# Patient Record
Sex: FEMALE | Race: WHITE | Employment: FULL TIME | URBAN - METROPOLITAN AREA
[De-identification: names, ages, dates, MRNs, and addresses within clinical notes are randomized per-mention and may not be internally consistent; named-entity substitution may affect disease eponyms.]

---

## 2017-02-22 ENCOUNTER — OFFICE VISIT (OUTPATIENT)
Dept: OBGYN CLINIC | Age: 52
End: 2017-02-22

## 2017-02-22 ENCOUNTER — APPOINTMENT (OUTPATIENT)
Dept: MAMMOGRAPHY | Age: 52
End: 2017-02-22

## 2017-02-22 VITALS
SYSTOLIC BLOOD PRESSURE: 114 MMHG | HEIGHT: 62 IN | DIASTOLIC BLOOD PRESSURE: 72 MMHG | BODY MASS INDEX: 33.57 KG/M2 | WEIGHT: 182.4 LBS

## 2017-02-22 DIAGNOSIS — Z12.31 ENCOUNTER FOR SCREENING MAMMOGRAM FOR MALIGNANT NEOPLASM OF BREAST: ICD-10-CM

## 2017-02-22 DIAGNOSIS — Z01.419 ENCOUNTER FOR GYNECOLOGICAL EXAMINATION (GENERAL) (ROUTINE) WITHOUT ABNORMAL FINDINGS: Primary | ICD-10-CM

## 2017-02-22 RX ORDER — LEVOTHYROXINE SODIUM 75 UG/1
TABLET ORAL
COMMUNITY

## 2017-02-22 RX ORDER — LANOLIN ALCOHOL/MO/W.PET/CERES
1000 CREAM (GRAM) TOPICAL DAILY
COMMUNITY

## 2017-02-22 NOTE — PATIENT INSTRUCTIONS

## 2017-02-22 NOTE — MR AVS SNAPSHOT
Visit Information Date & Time Provider Department Dept. Phone Encounter #  
 2/22/2017  9:10 AM Nathen Lazo MD [facility] [phone] [id number] Follow-up Instructions Return in about 1 year (around 2/22/2018), or if symptoms worsen or fail to improve, for Annual exam. Upcoming Health Maintenance Date Due  
 BREAST CANCER SCRN MAMMOGRAM 1/20/2016 INFLUENZA AGE 9 TO ADULT 8/1/2016 PAP AKA CERVICAL CYTOLOGY 1/20/2020 Allergies as of 2/22/2017  Review Complete On: 2/22/2017 By: Nathen Lazo MD  
  
 Severity Noted Reaction Type Reactions Amoxicillin  08/20/2010    Hives, Rash No shortness of breath Current Immunizations  Never Reviewed No immunizations on file. Not reviewed this visit You Were Diagnosed With   
  
 Codes Comments Encounter for gynecological examination (general) (routine) without abnormal findings    -  Primary ICD-10-CM: V89.408 ICD-9-CM: V72.31 Vitals Weight(growth percentile) 182 lb 6.4 oz (82.7 kg) BMI and BSA Data Body Mass Index Body Surface Area  
 33.36 kg/m 2 1.9 m 2 Your Updated Medication List  
  
   
This list is accurate as of: 2/22/17  9:14 AM.  Always use your most recent med list.  
  
  
  
  
 cyanocobalamin 1,000 mcg tablet Take 1,000 mcg by mouth daily. multivitamin capsule Take 1 Cap by mouth daily. SYNTHROID 75 mcg tablet Generic drug:  levothyroxine Take  by mouth Daily (before breakfast). Follow-up Instructions Return in about 1 year (around 2/22/2018), or if symptoms worsen or fail to improve, for Annual exam.  
  
  
Patient Instructions Well Visit, Ages 25 to 48: Care Instructions Your Care Instructions Physical exams can help you stay healthy. Your doctor has checked your overall health and may have suggested ways to take good care of yourself. He or she also may have recommended tests. At home, you can help prevent illness with healthy eating, regular exercise, and other steps. Follow-up care is a key part of your treatment and safety. Be sure to make and go to all appointments, and call your doctor if you are having problems. It's also a good idea to know your test results and keep a list of the medicines you take. How can you care for yourself at home? · Reach and stay at a healthy weight. This will lower your risk for many problems, such as obesity, diabetes, heart disease, and high blood pressure. · Get at least 30 minutes of physical activity on most days of the week. Walking is a good choice. You also may want to do other activities, such as running, swimming, cycling, or playing tennis or team sports. Discuss any changes in your exercise program with your doctor. · Do not smoke or allow others to smoke around you. If you need help quitting, talk to your doctor about stop-smoking programs and medicines. These can increase your chances of quitting for good. · Talk to your doctor about whether you have any risk factors for sexually transmitted infections (STIs). Having one sex partner (who does not have STIs and does not have sex with anyone else) is a good way to avoid these infections. · Use birth control if you do not want to have children at this time. Talk with your doctor about the choices available and what might be best for you. · Protect your skin from too much sun. When you're outdoors from 10 a.m. to 4 p.m., stay in the shade or cover up with clothing and a hat with a wide brim. Wear sunglasses that block UV rays. Even when it's cloudy, put broad-spectrum sunscreen (SPF 30 or higher) on any exposed skin. · See a dentist one or two times a year for checkups and to have your teeth cleaned. · Wear a seat belt in the car. · Drink alcohol in moderation, if at all.  That means no more than 2 drinks a day for men and 1 drink a day for women. Follow your doctor's advice about when to have certain tests. These tests can spot problems early. For everyone · Cholesterol. Have the fat (cholesterol) in your blood tested after age 21. Your doctor will tell you how often to have this done based on your age, family history, or other things that can increase your risk for heart disease. · Blood pressure. Have your blood pressure checked during a routine doctor visit. Your doctor will tell you how often to check your blood pressure based on your age, your blood pressure results, and other factors. · Vision. Talk with your doctor about how often to have a glaucoma test. 
· Diabetes. Ask your doctor whether you should have tests for diabetes. · Colon cancer. Have a test for colon cancer at age 48. You may have one of several tests. If you are younger than 48, you may need a test earlier if you have any risk factors. Risk factors include whether you already had a precancerous polyp removed from your colon or whether your parent, brother, sister, or child has had colon cancer. For women · Breast exam and mammogram. Talk to your doctor about when you should have a clinical breast exam and a mammogram. Medical experts differ on whether and how often women under 50 should have these tests. Your doctor can help you decide what is right for you. · Pap test and pelvic exam. Begin Pap tests at age 24. A Pap test is the best way to find cervical cancer. The test often is part of a pelvic exam. Ask how often to have this test. 
· Tests for sexually transmitted infections (STIs). Ask whether you should have tests for STIs. You may be at risk if you have sex with more than one person, especially if your partners do not wear condoms. For men · Tests for sexually transmitted infections (STIs). Ask whether you should have tests for STIs.  You may be at risk if you have sex with more than one person, especially if you do not wear a condom. · Testicular cancer exam. Ask your doctor whether you should check your testicles regularly. · Prostate exam. Talk to your doctor about whether you should have a blood test (called a PSA test) for prostate cancer. Experts differ on whether and when men should have this test. Some experts suggest it if you are older than 39 and are -American or have a father or brother who got prostate cancer when he was younger than 72. When should you call for help? Watch closely for changes in your health, and be sure to contact your doctor if you have any problems or symptoms that concern you. Where can you learn more? Go to http://shane-samia.info/. Enter P072 in the search box to learn more about \"Well Visit, Ages 25 to 48: Care Instructions. \" Current as of: July 19, 2016 Content Version: 11.1 © 5392-2973 [employer]. Care instructions adapted under license by [employer] (which disclaims liability or warranty for this information). If you have questions about a medical condition or this instruction, always ask your healthcare professional. [address] any warranty or liability for your use of this information. Introducing [facility]! [facility] introduces Agilyx patient portal. Now you can access parts of your medical record, email your doctor's office, and request medication refills online. 1. In your internet browser, go to [email or web address] 2. Click on the First Time User? Click Here link in the Sign In box. You will see the New Member Sign Up page. 3. Enter your Agilyx Access Code exactly as it appears below. You will not need to use this code after youve completed the sign-up process. If you do not sign up before the expiration date, you must request a new code. · Ag[employer] Access Code: [email or web address] Expires: 5/23/2017  9:03 AM 
 
 4. Enter the last four digits of your Social Security Number (xxxx) and Date of Birth (mm/dd/yyyy) as indicated and click Submit. You will be taken to the next sign-up page. 5. Create a BeyondCore ID. This will be your BeyondCore login ID and cannot be changed, so think of one that is secure and easy to remember. 6. Create a BeyondCore password. You can change your password at any time. 7. Enter your Password Reset Question and Answer. This can be used at a later time if you forget your password. 8. Enter your e-mail address. You will receive e-mail notification when new information is available in [address] 9. Click Sign Up. You can now view and download portions of your medical record. 10. Click the Download Summary menu link to download a portable copy of your medical information. If you have questions, please visit the Frequently Asked Questions section of the Beyond[employer] website. Remember, BeyondCore is NOT to be used for urgent needs. For medical emergencies, dial 911. Now available from your iPhone and Android! Please provide this summary of care documentation to your next provider. Your primary care clinician is listed as Andrey Mullen. If you have any questions after today's visit, please call [phone].

## 2018-09-26 ENCOUNTER — OFFICE VISIT (OUTPATIENT)
Dept: OBGYN CLINIC | Age: 53
End: 2018-09-26

## 2018-09-26 VITALS
SYSTOLIC BLOOD PRESSURE: 126 MMHG | HEIGHT: 62 IN | BODY MASS INDEX: 29.9 KG/M2 | DIASTOLIC BLOOD PRESSURE: 74 MMHG | WEIGHT: 162.5 LBS

## 2018-09-26 DIAGNOSIS — Z01.419 ENCOUNTER FOR GYNECOLOGICAL EXAMINATION: Primary | ICD-10-CM

## 2018-09-26 RX ORDER — PHENTERMINE AND TOPIRAMATE 7.5; 46 MG/1; MG/1
CAPSULE, EXTENDED RELEASE ORAL
Refills: 4 | COMMUNITY
Start: 2018-07-15 | End: 2021-03-30

## 2018-09-26 NOTE — PROGRESS NOTES
[address] [facility] 
[email or web address] 
[phone] Agnes Culver MD, Harrison Herron Annual Gynecologic Exam 
WWE 40-60 Chief Complaint Patient presents with  Well Woman Autumn Allan is a 50 y.o.  WHITE OR  
female who presents for an annual exam. 
Patient's last menstrual period was 2018. Naomi Gtz She does not report additional concerns today. Occ hot flashes, improved. Occ heavy menses. Menstrual status: 
Her periods are moderate. She does not report dysmenorrhea/painful menses. She does report irregular bleeding. Sexual history and Contraception: 
History Sexual Activity  Sexual activity: Yes  Partners: Male  Birth control/ protection: None Comment:  \"Farran\" She does not reports new sexual partner(s) in the last year. The patient does not request STD testing. Preventive Medicine History: 
Her most recent Pap smear result: normal was obtained in 2015 Her most recent HR HPV screen was Negative obtained in  She does not have a history of CARLOS 2, 3 or cervical cancer. Breast health: 
Last mammogram: approximate date 17 and was normal.  
A mammogram was not scheduled for today. Breast cancer family updated: see . Bone health: Naomi Gtz She does not have a history of osteopenia/osteoporosis. Osteoporosis family history updated: see . Past Medical History:  
Diagnosis Date  DJD (degenerative joint disease), lumbar 2010  
 H/O diagnostic mammography 13 BIRADS I - right side  H/O mammogram 2017  
 normal  
 History of mammogram 10/9/13  
 needs additional views  Hx of mammogram 01/15/15  
 normal  
 Pap smear for cervical cancer screening 6/2/10 Negative, HPV negative  Pap smear for cervical cancer screening 01/20/15  
 normal hpv negative OB History  Para Term  AB Living 2 2 2 0 0 2 SAB TAB Ectopic Molar Multiple Live Births  
0 0 0  0 2 # Outcome Date GA Lbr Lavon/2nd Weight Sex Delivery Anes PTL Lv  
2 Term 08/09/99    F VAGINAL DELI   DESEAN  
1 Term 03/28/97    M VAGINAL DELI   DESEAN Obstetric Comments No pregnancy complications Past Surgical History:  
Procedure Laterality Date  HX WISDOM TEETH EXTRACTION Family History Problem Relation Age of Onset  Heart Disease Father   
  multiple stents, age 66's Social History Social History  Marital status:  Spouse name: N/A  
 Number of children: 2  
 Years of education: N/A Occupational History [employer] Social History Main Topics  Smoking status: Never Smoker  Smokeless tobacco: Never Used  Alcohol use Yes Comment: occasionally  Drug use: No  
 Sexual activity: Yes  
  Partners: Male Birth control/ protection: None Comment:  \"Farran\" Other Topics Concern  Special Diet Yes  
  1200 calorie  Exercise Yes  
  at least 4 days/week Social History Narrative Allergies Allergen Reactions  Amoxicillin Hives and Rash No shortness of breath Current Outpatient Prescriptions Medication Sig  QSYMIA 7.5-46 mg CM24 TK ONE C PO  ONCE A DAY IN THE MORNING  
 Omega-3 Fatty Acids (FISH OIL) 500 mg cap Take  by mouth.  levothyroxine (SYNTHROID) 75 mcg tablet Take  by mouth Daily (before breakfast).  cyanocobalamin 1,000 mcg tablet Take 1,000 mcg by mouth daily.  multivitamin capsule Take 1 Cap by mouth daily. No current facility-administered medications for this visit. Patient Active Problem List  
Diagnosis Code  DJD (degenerative joint disease), lumbar M47.816  Abnormal mammogram R92.8  Obesity (BMI 30.0-34. 9) E66.9 Review of Systems - History obtained from the patient Constitutional: negative for weight loss, fever, night sweats HEENT: negative for hearing loss, earache, congestion, snoring, sorethroat CV: negative for chest pain, palpitations, edema Resp: negative for cough, shortness of breath, wheezing GI: negative for change in bowel habits, abdominal pain, black or bloody stools : negative for frequency, dysuria, hematuria, vaginal discharge MSK: negative for back pain, joint pain, muscle pain Breast: negative for breast lumps, nipple discharge, galactorrhea Skin :negative for itching, rash, hives Neuro: negative for dizziness, headache, confusion, weakness Psych: negative for anxiety, depression, change in mood Heme/lymph: negative for bleeding, bruising, pallor Physical Exam 
Visit Vitals  /74  Ht 5' 2\" (1.575 m)  Wt 162 lb 8 oz (73.7 kg)  LMP 09/14/2018  BMI 29.72 kg/m2 Constitutional 
· Appearance: well-nourished, well developed, alert, in no acute distress HENT 
· Head and Face: appears normal 
 
Neck · Inspection/Palpation: normal appearance, no masses or tenderness · Lymph Nodes: no lymphadenopathy present · Thyroid: gland size normal, nontender, no nodules or masses present on palpation Chest 
· Respiratory Effort: breathing unlabored · Auscultation: normal breath sounds Cardiovascular · Heart: 
· Auscultation: regular rate and rhythm without murmur Breasts · Inspection of Breasts: breasts symmetrical, no skin changes, no discharge present, nipple appearance normal, no skin retraction present · Palpation of Breasts and Axillae: no masses present on palpation, no breast tenderness · Axillary Lymph Nodes: no lymphadenopathy present Gastrointestinal 
· Abdominal Examination: abdomen non-tender to palpation, normal bowel sounds, no masses present · Liver and spleen: no hepatomegaly present, spleen not palpable · Hernias: no hernias identified Genitourinary · External Genitalia: normal appearance for age, no discharge present, no tenderness present, no inflammatory lesions present, no masses present, without atrophy present · Vagina: normal vaginal vault without central or paravaginal defects, no discharge present, no inflammatory lesions present, no masses present · Bladder: non-tender to palpation · Urethra: appears normal 
· Cervix: normal  
· Uterus: normal size, shape and consistency · Adnexa: no adnexal tenderness present, no adnexal masses present · Perineum: perineum within normal limits, no evidence of trauma, no rashes or skin lesions present · Anus: anus within normal limits, no hemorrhoids present · Inguinal Lymph Nodes: no lymphadenopathy present Skin · General Inspection: no rash, no lesions identified Neurologic/Psychiatric · Mental Status: · Orientation: grossly oriented to person, place and time · Mood and Affect: mood normal, affect appropriate Assessment: 
50 y.o.  for well woman exam 
Encounter Diagnosis Name Primary?  Encounter for gynecological examination Yes Plan: The patient was counseled about diet, exercise, healthy lifestyle We discussed current self breast exam and mammogram recommendations We discussed current pap smear and HR HPV testing guidelines We recommend follow up in one year for routine annual gynecologic exam or sooner if needed We recommend follow up with a primary care physician for any chronic medical problems or non-gynecologic concerns We discussed calcium/vitamin D/weight bearing exercise and osteoporosis prevention Handouts were given to the patient We discussed typical perimenopausal bleeding patterns and symptoms. I recommend that she notify MD for chaotic or symptomatic bleeding, or bleeding in between menses or intermenstrual bleeding for additional evaluation. She can also schedule a consult to discuss management of symptoms of perimenopause that are concerning her or interfering with her life or day to day function or activity. Pt prefers to observe for now. Discussed risks, benefits and alternatives of OCP/nuvaring/patch: including but not limited to dvt/pe/mi/cva/ca/gi risks. Disc option of non hormonal options/brisdelle. Pt will observe for now Folllow up: 
[x] return for annual well woman exam in one year or sooner if she is having problems 
[] follow up and ultrasound [x] mammogram 
[] 6 months 
[] 3 months 
[] 1 month Orders Placed This Encounter  PAP IG, HPV AND RFX HPV 73/71,46([id number]) No results found for any visits on 09/26/18.

## 2018-09-26 NOTE — PATIENT INSTRUCTIONS

## 2018-09-26 NOTE — MR AVS SNAPSHOT
[address] [facility] [address] [facility] 
[phone] Patient: Daphne Peter MRN: [id number] :1965 Visit Information Date & Time Provider Department Dept. Phone Encounter #  
 2018  3:40 PM MD Bradley Ansari  Your Appointments 10/15/2018  8:00 AM  
MAMMOGRAPHY with MAMMOGRAM, ROBB Kirby ([facility]) Appt Note: 2D + ae  TP; mammo only/TP 2-D  
 [address] [facility] [address] [place] [address]  
[address] 100[address] [facility] Upcoming Health Maintenance Date Due FOBT Q 1 YEAR AGE 50-75 2017 BREAST CANCER SCRN MAMMOGRAM 2018 Influenza Age 5 to Adult 2018 PAP AKA CERVICAL CYTOLOGY 2020 Allergies as of 2018  Review Complete On: 2018 By: Roe Mckeon LPN Severity Noted Reaction Type Reactions Amoxicillin  2010    Hives, Rash No shortness of breath Current Immunizations  Never Reviewed No immunizations on file. Not reviewed this visit You Were Diagnosed With   
  
 Codes Comments Encounter for gynecological examination    -  Primary ICD-10-CM: I83.202 ICD-9-CM: V72.31 Vitals BP Height(growth percentile) Weight(growth percentile) LMP BMI OB Status 126/74 5' 2\" (1.575 m) 162 lb 8 oz (73.7 kg) 2018 29.72 kg/m2 Having regular periods Smoking Status Never Smoker BMI and BSA Data Body Mass Index Body Surface Area  
 29.72 kg/m 2 1.8 m 2 Preferred Pharmacy Pharmacy Name Phone [facility] [address]  [facility] [phone] Your Updated Medication List  
  
   
This list is accurate as of 18  4:09 PM.  Always use your most recent med list.  
  
  
 cyanocobalamin 1,000 mcg tablet Take 1,000 mcg by mouth daily. FISH  mg Cap Generic drug:  Omega-3 Fatty Acids Take  by mouth.  
  
 multivitamin capsule Take 1 Cap by mouth daily. QSYMIA 7.5-46 mg Cm24 Generic drug:  phentermine-topiramate TK ONE C PO  ONCE A DAY IN THE MORNING  
  
 SYNTHROID 75 mcg tablet Generic drug:  levothyroxine Take  by mouth Daily (before breakfast). We Performed the Following PAP IG, HPV AND RFX HPV 13/02,53(977100) [TRY065304 Custom] Patient Instructions Well Visit, Ages 25 to 48: Care Instructions Your Care Instructions Physical exams can help you stay healthy. Your doctor has checked your overall health and may have suggested ways to take good care of yourself. He or she also may have recommended tests. At home, you can help prevent illness with healthy eating, regular exercise, and other steps. Follow-up care is a key part of your treatment and safety. Be sure to make and go to all appointments, and call your doctor if you are having problems. It's also a good idea to know your test results and keep a list of the medicines you take. How can you care for yourself at home? · Reach and stay at a healthy weight. This will lower your risk for many problems, such as obesity, diabetes, heart disease, and high blood pressure. · Get at least 30 minutes of physical activity on most days of the week. Walking is a good choice. You also may want to do other activities, such as running, swimming, cycling, or playing tennis or team sports. Discuss any changes in your exercise program with your doctor. · Do not smoke or allow others to smoke around you. If you need help quitting, talk to your doctor about stop-smoking programs and medicines. These can increase your chances of quitting for good.  
· Talk to your doctor about whether you have any risk factors for sexually transmitted infections (STIs). Having one sex partner (who does not have STIs and does not have sex with anyone else) is a good way to avoid these infections. · Use birth control if you do not want to have children at this time. Talk with your doctor about the choices available and what might be best for you. · Protect your skin from too much sun. When you're outdoors from 10 a.m. to 4 p.m., stay in the shade or cover up with clothing and a hat with a wide brim. Wear sunglasses that block UV rays. Even when it's cloudy, put broad-spectrum sunscreen (SPF 30 or higher) on any exposed skin. · See a dentist one or two times a year for checkups and to have your teeth cleaned. · Wear a seat belt in the car. · Drink alcohol in moderation, if at all. That means no more than 2 drinks a day for men and 1 drink a day for women. Follow your doctor's advice about when to have certain tests. These tests can spot problems early. For everyone · Cholesterol. Have the fat (cholesterol) in your blood tested after age 21. Your doctor will tell you how often to have this done based on your age, family history, or other things that can increase your risk for heart disease. · Blood pressure. Have your blood pressure checked during a routine doctor visit. Your doctor will tell you how often to check your blood pressure based on your age, your blood pressure results, and other factors. · Vision. Talk with your doctor about how often to have a glaucoma test. 
· Diabetes. Ask your doctor whether you should have tests for diabetes. · Colon cancer. Have a test for colon cancer at age 48. You may have one of several tests. If you are younger than 48, you may need a test earlier if you have any risk factors. Risk factors include whether you already had a precancerous polyp removed from your colon or whether your parent, brother, sister, or child has had colon cancer. For women · Breast exam and mammogram. Talk to your doctor about when you should have a clinical breast exam and a mammogram. Medical experts differ on whether and how often women under 50 should have these tests. Your doctor can help you decide what is right for you. · Pap test and pelvic exam. Begin Pap tests at age 24. A Pap test is the best way to find cervical cancer. The test often is part of a pelvic exam. Ask how often to have this test. 
· Tests for sexually transmitted infections (STIs). Ask whether you should have tests for STIs. You may be at risk if you have sex with more than one person, especially if your partners do not wear condoms. For men · Tests for sexually transmitted infections (STIs). Ask whether you should have tests for STIs. You may be at risk if you have sex with more than one person, especially if you do not wear a condom. · Testicular cancer exam. Ask your doctor whether you should check your testicles regularly. · Prostate exam. Talk to your doctor about whether you should have a blood test (called a PSA test) for prostate cancer. Experts differ on whether and when men should have this test. Some experts suggest it if you are older than 39 and are -American or have a father or brother who got prostate cancer when he was younger than 72. When should you call for help? Watch closely for changes in your health, and be sure to contact your doctor if you have any problems or symptoms that concern you. Where can you learn more? Go to http://shane-samia.info/. Enter P072 in the search box to learn more about \"Well Visit, Ages 25 to 48: Care Instructions. \" Current as of: May 16, 2017 Content Version: 11.7 © 6710-1961 Healthwise, Incorporated. Care instructions adapted under license by [employer] (which disclaims liability or warranty for this information).  If you have questions about a medical condition or this instruction, always ask your healthcare professional. [address]  any warranty or liability for your use of this information. Introducing [facility]! [facility] introduces Quantifind patient portal. Now you can access parts of your medical record, email your doctor's office, and request medication refills online. 1. In your internet browser, go to [email or web address] 2. Click on the First Time User? Click Here link in the Sign In box. You will see the New Member Sign Up page. 3. Enter your Quantifind Access Code exactly as it appears below. You will not need to use this code after youve completed the sign-up process. If you do not sign up before the expiration date, you must request a new code. · Quant[employer]ind Access Code: [email or web address] Expires: 12/25/2018  4:09 PM 
 
4. Enter the last four digits of your Social Security Number (xxxx) and Date of Birth (mm/dd/yyyy) as indicated and click Submit. You will be taken to the next sign-up page. 5. Create a Quantifind ID. This will be your Quantifind login ID and cannot be changed, so think of one that is secure and easy to remember. 6. Create a Quantifind password. You can change your password at any time. 7. Enter your Password Reset Question and Answer. This can be used at a later time if you forget your password. 8. Enter your e-mail address. You will receive e-mail notification when new information is available in [address]. 9. Click Sign Up. You can now view and download portions of your medical record. 10. Click the Download Summary menu link to download a portable copy of your medical information. If you have questions, please visit the Frequently Asked Questions section of the Quantifind website. Remember, Quantifind is NOT to be used for urgent needs. For medical emergencies, dial 911. Now available from your iPhone and Android! Please provide this summary of care documentation to your next provider. Your primary care clinician is listed as Lamont Santoyo. If you have any questions after today's visit, please call [phone].

## 2018-09-28 LAB
CYTOLOGIST CVX/VAG CYTO: NORMAL
CYTOLOGY CVX/VAG DOC THIN PREP: NORMAL
CYTOLOGY HISTORY:: NORMAL
DX ICD CODE: NORMAL
HPV I/H RISK 1 DNA CVX QL PROBE+SIG AMP: NEGATIVE
Lab: NORMAL
OTHER STN SPEC: NORMAL
PATH REPORT.FINAL DX SPEC: NORMAL
STAT OF ADQ CVX/VAG CYTO-IMP: NORMAL

## 2018-10-15 ENCOUNTER — APPOINTMENT (OUTPATIENT)
Dept: OBGYN CLINIC | Age: 53
End: 2018-10-15

## 2019-12-09 ENCOUNTER — OFFICE VISIT (OUTPATIENT)
Dept: OBGYN CLINIC | Age: 54
End: 2019-12-09

## 2019-12-09 VITALS — DIASTOLIC BLOOD PRESSURE: 68 MMHG | WEIGHT: 162 LBS | SYSTOLIC BLOOD PRESSURE: 100 MMHG | BODY MASS INDEX: 29.63 KG/M2

## 2019-12-09 DIAGNOSIS — N93.0 PCB (POST COITAL BLEEDING): ICD-10-CM

## 2019-12-09 DIAGNOSIS — Z01.411 ENCOUNTER FOR GYNECOLOGICAL EXAMINATION (GENERAL) (ROUTINE) WITH ABNORMAL FINDINGS: Primary | ICD-10-CM

## 2019-12-09 DIAGNOSIS — N92.6 IRREGULAR PERIODS: ICD-10-CM

## 2019-12-09 DIAGNOSIS — R92.8 ABNORMAL MAMMOGRAM OF RIGHT BREAST: ICD-10-CM

## 2019-12-09 NOTE — PROGRESS NOTES
[address] [facility]  [email or web address]  [phone]    Daryn Burk MD, [address] [facility]       Annual Gynecologic Exam  WWE 90-03  Chief Complaint   Patient presents with    Well Woman       Alverto Vargas is a 48 y.o.  WHITE OR   female who presents for an annual exam.  Patient's last menstrual period was 2019. Abimael Mathias She does report additional concerns today. Patient C/O of no periods in about a year and then had a period 2 weeks. Also c/o PCB for several episodes. Menstrual status:  Her periods are was absent for 1 year then had a period that was heavy that lasted 1 week. She does not report dysmenorrhea/painful menses. She does not report irregular bleeding. Sexual history and Contraception:  Social History     Substance and Sexual Activity   Sexual Activity Yes    Partners: Male    Birth control/protection: None    Comment:  \"Farran\"       She does not reports new sexual partner(s) in the last year. The patient does not request STD testing. Preventive Medicine History:  Her most recent Pap smear result: normal was obtained in 2018    Her most recent HR HPV screen was Negative obtained in 2018    She does not have a history of CARLOS 2, 3 or cervical cancer. Breast health:  Last mammogram: was normal.   A mammogram was scheduled for today. Breast cancer family updated: see . Bone health: Abimael Mathias She does not have a history of osteopenia/osteoporosis. Osteoporosis family history updated: see .      Past Medical History:   Diagnosis Date    DJD (degenerative joint disease), lumbar 2010    H/O diagnostic mammography 13    BIRADS I - right side    H/O mammogram 2017    normal    History of mammogram 10/9/13    needs additional views    Hx of mammogram 10/15/2018    normal    Pap smear for cervical cancer screening 6/2/10    Negative, HPV negative    Pap smear for cervical cancer screening 01/20/15; 18 normal hpv negative; neg/neg     OB History    Para Term  AB Living   2 2 2 0 0 2   SAB TAB Ectopic Molar Multiple Live Births   0 0 0   0 2      # Outcome Date GA Lbr Lavon/2nd Weight Sex Delivery Anes PTL Lv   2 Term 99    F VAGINAL DELI   DESEAN   1 Term 97    M VAGINAL DELI   DESEAN      Obstetric Comments   No pregnancy complications       Past Surgical History:   Procedure Laterality Date    HX WISDOM TEETH EXTRACTION       Family History   Problem Relation Age of Onset    Heart Disease Father         multiple stents, age 66's     Social History     Socioeconomic History    Marital status:      Spouse name: Not on file    Number of children: 2    Years of education: Not on file    Highest education level: Not on file   Occupational History     Employer: [employer]    Financial resource strain: Not on file    Food insecurity:     Worry: Not on file     Inability: Not on file    Transportation needs:     Medical: Not on file     Non-medical: Not on file   Tobacco Use    Smoking status: Never Smoker    Smokeless tobacco: Never Used   Substance and Sexual Activity    Alcohol use: Yes     Comment: occasionally     Drug use: No    Sexual activity: Yes     Partners: Male     Birth control/protection: None     Comment:  \"Farran\"   Lifestyle    Physical activity:     Days per week: Not on file     Minutes per session: Not on file    Stress: Not on file   Relationships    Social connections:     Talks on phone: Not on file     Gets together: Not on file     Attends Anabaptism service: Not on file     Active member of club or organization: Not on file     Attends meetings of clubs or organizations: Not on file     Relationship status: Not on file    Intimate partner violence:     Fear of current or ex partner: Not on file     Emotionally abused: Not on file     Physically abused: Not on file     Forced sexual activity: Not on file   Other Topics Concern     Service Not Asked    Blood Transfusions Not Asked    Caffeine Concern Not Asked    Occupational Exposure Not Asked    Hobby Hazards Not Asked    Sleep Concern Not Asked    Stress Concern Not Asked    Weight Concern Not Asked    Special Diet Yes     Comment: 1200 calorie    Back Care Not Asked    Exercise Yes     Comment: at least 4 days/week    Bike Helmet Not Asked    Seat Belt Not Asked    Self-Exams Not Asked   Social History Narrative    Not on file       Allergies   Allergen Reactions    Amoxicillin Hives and Rash     No shortness of breath       Current Outpatient Medications   Medication Sig    QSYMIA 7.5-46 mg CM24 TK ONE C PO  ONCE A DAY IN THE MORNING    Omega-3 Fatty Acids (FISH OIL) 500 mg cap Take  by mouth.  levothyroxine (SYNTHROID) 75 mcg tablet Take  by mouth Daily (before breakfast).  cyanocobalamin 1,000 mcg tablet Take 1,000 mcg by mouth daily.  multivitamin capsule Take 1 Cap by mouth daily. No current facility-administered medications for this visit. Patient Active Problem List   Diagnosis Code    DJD (degenerative joint disease), lumbar M47.816    Abnormal mammogram R92.8    Obesity (BMI 30.0-34. 9) E66.9       Review of Systems - History obtained from the patient  Constitutional: negative for weight loss, fever, night sweats  HEENT: negative for hearing loss, earache, congestion, snoring, sorethroat  CV: negative for chest pain, palpitations, edema  Resp: negative for cough, shortness of breath, wheezing  GI: negative for change in bowel habits, abdominal pain, black or bloody stools  : negative for frequency, dysuria, hematuria, vaginal discharge  MSK: negative for back pain, joint pain, muscle pain  Breast: negative for breast lumps, nipple discharge, galactorrhea  Skin :negative for itching, rash, hives  Neuro: negative for dizziness, headache, confusion, weakness  Psych: negative for anxiety, depression, change in mood  Heme/lymph: negative for bleeding, bruising, pallor      (WWE continued)     Physical Exam  Visit Vitals  /68   Wt 162 lb (73.5 kg)   LMP 11/25/2019   BMI 29.63 kg/m²       Constitutional  · Appearance: well-nourished, well developed, alert, in no acute distress    HENT  · Head and Face: appears normal    Neck  · Inspection/Palpation: normal appearance, no masses or tenderness  · Lymph Nodes: no lymphadenopathy present  · Thyroid: gland size normal, nontender, no nodules or masses present on palpation    Chest  · Respiratory Effort: breathing unlabored  · Auscultation: normal breath sounds    Cardiovascular  · Heart:  · Auscultation: regular rate and rhythm without murmur    Breasts  · Inspection of Breasts: breasts symmetrical, no skin changes, no discharge present, nipple appearance normal, no skin retraction present  · Palpation of Breasts and Axillae: no masses present on palpation, no breast tenderness  · Axillary Lymph Nodes: no lymphadenopathy present    Gastrointestinal  · Abdominal Examination: abdomen non-tender to palpation, normal bowel sounds, no masses present  · Liver and spleen: no hepatomegaly present, spleen not palpable  · Hernias: no hernias identified    Genitourinary  · External Genitalia: normal appearance for age, no discharge present, no tenderness present, no inflammatory lesions present, no masses present, without atrophy present  · Vagina: normal vaginal vault without central or paravaginal defects, no discharge present, no inflammatory lesions present, no masses present  · Bladder: non-tender to palpation  · Urethra: appears normal  · Cervix: normal   · Uterus: normal size, shape and consistency  · Adnexa: no adnexal tenderness present, no adnexal masses present  · Perineum: perineum within normal limits, no evidence of trauma, no rashes or skin lesions present  · Anus: anus within normal limits, no hemorrhoids present  · Inguinal Lymph Nodes: no lymphadenopathy present    Skin  · General Inspection: no rash, no lesions identified    Neurologic/Psychiatric  · Mental Status:  · Orientation: grossly oriented to person, place and time  · Mood and Affect: mood normal, affect appropriate    Assessment:  48 y.o.  for well woman exam  Encounter Diagnoses   Name Primary?  Abnormal mammogram of right breast     Irregular periods     PCB (post coital bleeding)     Encounter for gynecological examination (general) (routine) with abnormal findings Yes       Plan:  The patient was counseled about diet, exercise, healthy lifestyle  We discussed current self breast exam and mammogram recommendations  We discussed current pap smear and HR HPV testing guidelines  We recommend follow up in one year for routine annual gynecologic exam or sooner if needed  We recommend follow up with a primary care physician for any chronic medical problems or non-gynecologic concerns    We discussed calcium/vitamin D/weight bearing exercise and osteoporosis prevention  Handouts were given to the patient  Labs  SIS ho  Endo bx ho  Recommend dietary calcium 1200mg per day, vitamin D 400-800 international units per day and daily weight bearing exercise 30 minutes per day. We discussed options for managing symptoms including but not limited to observation, NSAIDS, hormonal management, IUDs, ablation, and hysterectomy.   FU sis, probable endo bx  Bleeding precautions, keep log     Folllow up:  [x] return for annual well woman exam in one year or sooner if she is having problems  [] follow up and ultrasound  [x] mammogram  [] 6 months  [] 3 months  [] 1 month    Orders Placed This Encounter    CHLAMYDIA/GC PCR    FERNANDA 3D JELLY W MAMMO RT DX INCL CAD    CBC W/O DIFF    TSH 3RD GENERATION    T4, FREE    PROLACTIN       Results for orders placed or performed in visit on 19   FERNANDA 3D JELLY W MAMMO BI SCREENING INCL CAD    Narrative    STUDY: Bilateral digital screening mammogram with 3-D tomosynthesis    INDICATION:  Screening. COMPARISON: Most recent 2018    BREAST COMPOSITION: There are scattered areas of fibroglandular density. FINDINGS: Bilateral digital screening mammography was performed and is  interpreted in conjunction with a computer assisted detection (CAD) system. Additionally, tomosynthesis of both breasts in the CC and MLO projections was  performed. There is possible distortion in the retroareolar right breast.      Impression    IMPRESSION:  BI-RADS 0: Incomplete. Needs additional imaging evaluation. RECOMMENDATIONS:  Spot views and true lateral view right breast.     The patient will be notified of these results.

## 2019-12-09 NOTE — PATIENT INSTRUCTIONS

## 2019-12-10 LAB
ERYTHROCYTE [DISTWIDTH] IN BLOOD BY AUTOMATED COUNT: 12 % (ref 12.3–15.4)
HCT VFR BLD AUTO: 47.4 % (ref 34–46.6)
HGB BLD-MCNC: 15.9 G/DL (ref 11.1–15.9)
MCH RBC QN AUTO: 29.6 PG (ref 26.6–33)
MCHC RBC AUTO-ENTMCNC: 33.5 G/DL (ref 31.5–35.7)
MCV RBC AUTO: 88 FL (ref 79–97)
PLATELET # BLD AUTO: 348 X10E3/UL (ref 150–450)
PROLACTIN SERPL-MCNC: 5.4 NG/ML (ref 4.8–23.3)
RBC # BLD AUTO: 5.38 X10E6/UL (ref 3.77–5.28)
T4 FREE SERPL-MCNC: 1.61 NG/DL (ref 0.82–1.77)
TSH SERPL DL<=0.005 MIU/L-ACNC: 1.05 UIU/ML (ref 0.45–4.5)
WBC # BLD AUTO: 6.2 X10E3/UL (ref 3.4–10.8)

## 2019-12-11 NOTE — PROGRESS NOTES
The results are normal, or acceptable variants  There is no anemia, thyroid screening labs wnl,   We can also discuss in more detail at [place]: 12/19  Please notify patient. Recommend f/u if still having symptoms/problems or has additional concerns.

## 2019-12-12 LAB
C TRACH RRNA SPEC QL NAA+PROBE: NEGATIVE
N GONORRHOEA RRNA SPEC QL NAA+PROBE: NEGATIVE

## 2019-12-19 ENCOUNTER — OFFICE VISIT (OUTPATIENT)
Dept: OBGYN CLINIC | Age: 54
End: 2019-12-19

## 2019-12-19 ENCOUNTER — HOSPITAL ENCOUNTER (OUTPATIENT)
Dept: MAMMOGRAPHY | Age: 54
Discharge: HOME | End: 2019-12-19
Attending: OBSTETRICS & GYNECOLOGY
Payer: COMMERCIAL

## 2019-12-19 VITALS
HEIGHT: 62 IN | DIASTOLIC BLOOD PRESSURE: 78 MMHG | SYSTOLIC BLOOD PRESSURE: 110 MMHG | WEIGHT: 161 LBS | BODY MASS INDEX: 29.63 KG/M2

## 2019-12-19 DIAGNOSIS — N93.9 ABNORMAL UTERINE BLEEDING: ICD-10-CM

## 2019-12-19 DIAGNOSIS — R92.8 ABNORMAL MAMMOGRAM OF RIGHT BREAST: ICD-10-CM

## 2019-12-19 DIAGNOSIS — R93.89 ABNORMAL GENITOURINARY ULTRASOUND: ICD-10-CM

## 2019-12-19 DIAGNOSIS — N93.0 POSTCOITAL BLEEDING: ICD-10-CM

## 2019-12-19 DIAGNOSIS — N92.6 IRREGULAR PERIODS: Primary | ICD-10-CM

## 2019-12-19 LAB
HCG URINE, QL. (POC): NEGATIVE
VALID INTERNAL CONTROL?: YES

## 2019-12-19 PROCEDURE — 77061 BREAST TOMOSYNTHESIS UNI: CPT

## 2019-12-19 NOTE — PATIENT INSTRUCTIONS
Sonohysterogram: About This Test 
What is it? Sonohysterography (say \"nls-hwa-tclw-ter--[facility]-fee\") is an ultrasound test. The doctor fills the uterus with fluid. Then he or she uses sound waves to look at the inside of the uterus. This test doesn't use X-rays or an iodine dye. You won't hear or feel the sound waves. Why is it done? A sonohysterogram may be done if other tests don't show enough detail. A clearer view of the uterus can help to: 
· Look for the cause of abnormal vaginal bleeding. · Look for the cause of fertility problems or repeated miscarriages. · Find problems in the uterus, such as an abnormal shape or structure. · Look for an injury, polyps, fibroids, or scars. How can you get ready for the test? 
Before the test, tell your doctor if: 
· You are or might be pregnant. · You think you might have a pelvic infection or a sexually transmitted infection (STI), such as gonorrhea or chlamydia. You may want to bring a sanitary pad. Some of the saline solution may leak out after the test. You also may have some slight bleeding. Your doctor may schedule the test for soon after your period ends. What happens before the test? 
· You may get medicine to help prevent cramps during the test. 
· You'll be asked to take off your clothes below the waist. You will get a paper or cloth cover to put over the lower half of your body. · You will be asked to empty your bladder before you undress. What happens during the test? 
· You will lie on your back on an exam table. You'll be asked to relax with your knees apart. · The doctor will use a device called a speculum to gently open the vagina a little bit. This lets the doctor see the cervix. The cervix will be washed with a special soap. · A thin, flexible tube (catheter) will be put through the cervix into the uterus. · A balloon on the tip of the tube will be inflated.  It will help keep fluid from leaking out during the test. Then your doctor will take the speculum out. · Your doctor will place an ultrasound wand (transducer) in your vagina. You may feel some pressure as the wand is put into your vagina. The pictures are shown on a TV screen during the test. 
· Sterile saline solution will be slowly put into the uterus through the tube. You may feel some cramping when the catheter is inserted through the cervix and the saline is injected into your uterus. · Your doctor may put a small amount of air into the tube if he or she also wants to look at your fallopian tubes. Air bubbles help your doctor see if the tubes are blocked. · At the end of the test, the wand and the tube will be removed. How long does the test take? The test will take about 15 to 30 minutes. What happens after the test? 
· You will probably be able to go home right away. · You can go back to your usual activities right away. · Some of the saline solution will leak out of your vagina. You may also see some spots of blood. Wearing a pad can help absorb the fluid. · You may have some cramping for a couple of days after the test. You can take an over-the-counter pain medicine to relieve cramping. When should you call for help? Call your doctor now or seek immediate medical care if: 
· You have a fever. · You have new or worse pain in your pelvis. · You have new or worse vaginal bleeding. Follow-up care is a key part of your treatment and safety. Be sure to make and go to all appointments, and call your doctor if you are having problems. It's also a good idea to keep a list of the medicines you take. Ask your doctor when you can expect to have your test results. Where can you learn more? Go to http://shane-samia.info/. Enter [id number] in the search box to learn more about \"Sonohysterogram: About This Test.\" Current as of: May 29, 2019 Content Version: 12.2 © 7884-3840 [employer]. Care instructions adapted under license by [employer] (which disclaims liability or warranty for this information). If you have questions about a medical condition or this instruction, always ask your healthcare professional. [address] any warranty or liability for your use of this information.

## 2019-12-19 NOTE — PROGRESS NOTES
[facility]  [email or web address]  [phone]    Dana Richey MD, FACOG       OB/GYN Problem visit    Chief Complaint:   Chief Complaint   Patient presents with    Ultrasound     SIS    Menstrual Problem    Postcoital Bleeding       History of Present Illness: This is a new problem being evaluated by this provider. The patient is a 48 y.o.  female who reports having no periods for 1 year and then on 2019, she had 2 periods. UPT negative     She reports the symptoms are is unchanged. Aggravating factors include none. Alleviating factors include none. She does not have other concerns. LMP: Patient's last menstrual period was 2019.     PFSH:  Past Medical History:   Diagnosis Date    Abnormal mammogram 2019    possible distortion in the retroareolar right breast, dx mammo ordered    DJD (degenerative joint disease), lumbar 2010    H/O diagnostic mammography 13    BIRADS I - right side    H/O mammogram 2017    normal    History of mammogram 10/9/13    needs additional views    Hx of mammogram 10/15/2018    normal    Pap smear for cervical cancer screening 6/2/10    Negative, HPV negative    Pap smear for cervical cancer screening 01/20/15; 18    normal hpv negative; neg/neg     Past Surgical History:   Procedure Laterality Date    HX WISDOM TEETH EXTRACTION       Family History   Problem Relation Age of Onset    Heart Disease Father         multiple stents, age 66's     Social History     Tobacco Use    Smoking status: Never Smoker    Smokeless tobacco: Never Used   Substance Use Topics    Alcohol use: Yes     Comment: occasionally     Drug use: No     Allergies   Allergen Reactions    Amoxicillin Hives and Rash     No shortness of breath     Current Outpatient Medications   Medication Sig    QSYMIA 7.5-46 mg CM24 TK ONE C PO  ONCE A DAY IN THE MORNING    Omega-3 Fatty Acids (FISH OIL) 500 mg cap Take  by mouth.    levothyroxine (SYNTHROID) 75 mcg tablet Take  by mouth Daily (before breakfast).  cyanocobalamin 1,000 mcg tablet Take 1,000 mcg by mouth daily.  multivitamin capsule Take 1 Cap by mouth daily. No current facility-administered medications for this visit. Review of Systems:  History obtained from the patient  Constitutional: negative for fevers, chills and weight loss  ENT ROS: negative for - hearing change, oral lesions or visual changes  Respiratory: negative for cough, wheezing or dyspnea on exertion  Cardiovascular: negative for chest pain, irregular heart beats, exertional chest pressure/discomfort  Gastrointestinal: negative for dysphagia, nausea and vomiting  Genito-Urinary ROS:  see HPI  Inteument/breast: negative for rash, breast lump and nipple discharge  Musculoskeletal:negative for stiff joints, neck pain and muscle weakness  Endocrine ROS: negative for - breast changes, galactorrhea or temperature intolerance  Hematological and Lymphatic ROS: negative for - blood clots, bruising or swollen lymph nodes    Physical Exam:  Visit Vitals  /78   Ht 5' 2\" (1.575 m)   Wt 161 lb (73 kg)   BMI 29.45 kg/m²       GENERAL: alert, well appearing, and in no distress  HEAD: normocephalic, atraumatic. ABDOMEN: soft, nontender, nondistended, no masses or organomegaly   EGBUS: no lesions, no inflammation, no masses  VULVA: normal appearing vulva with no masses, tenderness or lesions  VAGINA: normal appearing vagina with normal color, no lesions, no discharge  CERVIX: normal appearing cervix without discharge or lesions, non tender  UTERUS: uterus is normal size, shape, consistency and nontender   ADNEXA: normal adnexa in size, nontender and no masses  NEURO: alert, oriented, normal speech    Assessment:  Encounter Diagnoses   Name Primary?     Irregular periods Yes    Abnormal uterine bleeding     Abnormal genitourinary ultrasound     Postcoital bleeding        Plan:  The patient is advised that she should contact the office if she does not note improvement or if symptoms recur  Recommend follow up with PCP for non-gynecologic complaints and chronic medical problems. She should contact our office with any questions or concerns  She could keep her routine annual exam appointment. Discussed risks, benefits and alternatives to procedure, including not performing it. Discussed bleeding, infection, anesthesia risks, damage to internal organs; bladder/bowel/other internal organs, scarring, additional procedures if needed. We also discussed that even death can be a rare complication. Consider hystero d and c polypectomy vs myomectomy, await endo bx (consent not done) vs close observation if bleeding resolves    Physician review of ultrasound performed by technician    Today's ultrasound report and images were reviewed and discussed with the patient.   Please see images and imaging report entered by technician in PACS for more detail and progress note and diagnosis entered by MD.    Artie Spears MD      Orders Placed This Encounter    BIOPSY OF UTERUS LINING    AMB POC URINE PREGNANCY TEST, VISUAL COLOR COMPARISON    SURGICAL PATHOLOGY       Results for orders placed or performed in visit on 12/19/19   AMB POC URINE PREGNANCY TEST, VISUAL COLOR COMPARISON   Result Value Ref Range    VALID INTERNAL CONTROL POC Yes     HCG urine, Ql. (POC) Negative Negative       [facility]  OFFICE PROCEDURE PROGRESS NOTE    Chart reviewed for the following:   Aure ROMO MD, have reviewed the History, Physical and updated the Allergic reactions for [address] performed immediately prior to start of procedure:   Aure ROMO MD, have performed the following reviews on [facility] prior to the start of the procedure:            * Patient was identified by name and date of birth   * Agreement on procedure being performed was verified  * Risks and Benefits explained to the patient  * Procedure site verified and marked as necessary  * Patient was positioned for comfort  * Consent was signed and verified     Time: 11:24am    Date of procedure: 2019    Procedure performed by: Evan Castillo MD    Provider assisted by:   Gina Ruelas LPN    Patient assisted by: self    How tolerated by patient: tolerated the procedure well with no complications    Post Procedural Pain Scale: 0 - No Hurt    Comments: none      Sonohysterography procedure (SIS)    Tye Goncalves is a ,  48 y.o. female [facility] Patient's last menstrual period was 2019. She presents for a sonohysterography. The indications for this procedure were reviewed with the patient. The procedure was explained in detail and all questions were answered. Procedure: The patient was placed in the lithotomy position. A graves speculum was introduced into the vagina and the cervix was visualized. The cervix was prepped with zephrin solution. A tenaculum was not placed on the anterior cervix for traction. It was not necessary to dilate the cervix. A Cook's Hysterography catheter was then introduced into the uterine cavity and the speculum was removed. Sterile sonohysterography with 3D Reconstruction was performed. The endometrial cavity was distended with sterile saline. The findings are as follows: normal cavity with ? Myoma near fundus. The patient tolerated the procedure well without complication, and was discharged to home.      US report:             Patterson Burkitt, MD, [address] [facility]  [address] [facility].  [phone]     [facility]  OFFICE PROCEDURE PROGRESS NOTE        Chart reviewed for the following:   Frances Santana MD, have reviewed the History, Physical and updated the Allergic reactions for [address] [place] [address] performed immediately prior to start of procedure:   Frances Santana MD, have performed the following reviews on Rose Portillo prior to the start of the procedure:            * Patient was identified by name and date of birth   * Agreement on procedure being performed was verified  * Risks and Benefits explained to the patient  * Procedure site verified and marked as necessary  * Patient was positioned for comfort  * Consent was signed and verified     Time: 11:24am      Date of procedure: 2019    Procedure performed by: James Sharma MD    Provider assisted by: Saniya Gomez LPN    Patient assisted by: self    How tolerated by patient: tolerated the procedure well with no complications    Post Procedural Pain Scale: 2 - Hurts Little Bit    Comments: none              Procedure note: Endometrial biopsy     Rose Portillo is a ,  48 y.o. female [facility] Patient's last menstrual period was 2019. The patient has a history of  irregular menses. After the indications, risks, benefits, and alternatives to performing an endometrial biopsy were explained to the patient, her questions were answered and informed consent was obtained. Patient wanted to proceed with office endometrial biopsy evaluation. Procedure: The patient was placed on the table in the dorsal lithotomy position. A bimanual exam showed the uterus to be anterior. The uterus was not enlarged. A speculum was placed in the vagina. The cervix was visualized and prepped with zephrin. A tenaculum was placed on the anterior lip of the cervix for traction. It was was not necessary to dilate the cervix. A pipelle was passed through the endocervical canal without difficulty. The uterus was sounded to 7 cm's. A medium amount of tissue was returned. This tissue was placed in formalin and sent to pathology. It was felt that an adequate sample was obtained. The patient tolerated the procedure well and she reported level of cramping as moderate. Good hemostasis was noted. All instruments were removed. Post Procedural Status: The patient was observed for 10 minutes after the procedure. She had pain level:  mild at the time of discharge. There were no complications. The patient was discharged in stable condition. The patient was given routine post endometrial biopsy instructions. She should notify MD with any questions, concerns, fever, or worsening pain. We discussed that she will be contacted with the pathology results.

## 2019-12-20 NOTE — PROGRESS NOTES
MMG normal.  Please update chart per protocol.   Update PMH and HM: include:  BIRADS level (0-6)  date of imaging (mm/dd/yy)  add \"dense breast tissue\" if in comments  add Radiologist impression, if indicated: comments/measurements from radiologist related to lymph nodes/cysts/mass  If not bilateral: record side of imaging  If early follow up recommended: add to notes and tickle

## 2019-12-24 LAB
DX ICD CODE: NORMAL
PATH REPORT.FINAL DX SPEC: NORMAL
PATH REPORT.GROSS SPEC: NORMAL
PATH REPORT.RELEVANT HX SPEC: NORMAL
PATH REPORT.SITE OF ORIGIN SPEC: NORMAL
PATHOLOGIST NAME: NORMAL
PAYMENT PROCEDURE: NORMAL

## 2020-01-06 NOTE — PROGRESS NOTES
Pathology: benign endometrium   Please notify patient. Update FS per pathology protocol   Update PSH:  Include date of procedure  procedure name (check op note),   MD performing surgery initials in comments  pathology results in comments, when available: cut and paste prn  Rec proceed with hystero d and c polypectomy vs myomectomy if AUB persists. Tickle for fu.

## 2020-01-08 NOTE — PROGRESS NOTES
Patient advised of MD reviewed pathology and recommendations. Patient currently does not have any further bleeding and was advised to call back if the bleeding resumes to set up appointment to discuss further options of surgery. Patient verbalized understanding.

## 2021-03-30 ENCOUNTER — OFFICE VISIT (OUTPATIENT)
Dept: OBGYN CLINIC | Age: 56
End: 2021-03-30

## 2021-03-30 VITALS
HEIGHT: 62 IN | HEART RATE: 85 BPM | WEIGHT: 158 LBS | DIASTOLIC BLOOD PRESSURE: 77 MMHG | BODY MASS INDEX: 29.08 KG/M2 | SYSTOLIC BLOOD PRESSURE: 108 MMHG

## 2021-03-30 DIAGNOSIS — R68.89 HEAT INTOLERANCE: ICD-10-CM

## 2021-03-30 DIAGNOSIS — Z12.4 ENCOUNTER FOR PAPANICOLAOU SMEAR FOR CERVICAL CANCER SCREENING: Primary | ICD-10-CM

## 2021-03-30 DIAGNOSIS — Z01.419 ENCOUNTER FOR WELL WOMAN EXAM WITH ROUTINE GYNECOLOGICAL EXAM: ICD-10-CM

## 2021-03-30 DIAGNOSIS — Z11.51 ENCOUNTER FOR SCREENING FOR HUMAN PAPILLOMAVIRUS (HPV): ICD-10-CM

## 2021-03-30 PROCEDURE — 99396 PREV VISIT EST AGE 40-64: CPT | Performed by: OBSTETRICS & GYNECOLOGY

## 2021-03-30 RX ORDER — PAROXETINE 7.5 MG/1
1 CAPSULE ORAL DAILY
Qty: 90 CAP | Refills: 0 | Status: SHIPPED | OUTPATIENT
Start: 2021-03-30

## 2021-03-30 NOTE — PROGRESS NOTES
[facility]  [email or web address]  [phone]    Vik Osborne MD, [address] [facility]       Annual Gynecologic Exam  WWE 15  Chief Complaint   Patient presents with    Well Woman    Jose Gaviria is a 50 y.o.  WHITE  female who presents for an annual exam.  Patient's last menstrual period was 2019 (approximate). Patient has the following concerns today: None. Heat intolerance, interferes with sleeping. Menstrual status:  Pt notes she has not had a menstrual cycle since emb bx 2019      Sexual history and Contraception:  Social History     Substance and Sexual Activity   Sexual Activity Yes    Partners: Male    Birth control/protection: None    Comment:  \"Farran\"       She does not reports new sexual partner(s) in the last year. Preventive Medicine History:  Her most recent Pap smear result: normal was obtained in 2018    Her most recent HR HPV screen was Negative obtained in 2018    She does not have a history of CARLOS 2, 3 or cervical cancer. Breast health:  [facility] Results (most recent):  Results from [place] encounter on 21   [facility] 3D JELLY W MAMMO BI SCREENING INCL CAD    Narrative STUDY:  Bilateral Digital Screening 3D breast tomosynthesis is performed and  interpreted in conjunction with CAD. INDICATION:  Screening. Direct comparison is made to multiple prior mammograms. BREAST COMPOSITION: There are scattered fibroglandular densities (approximately  25-50% glandular). FINDINGS: The breast parenchyma is stable bilaterally. No suspicious masses or  calcifications are identified. There is no skin thickening or nipple retraction. There has been no significant change. Impression BI-RADS 1. Negative. No mammographic evidence of malignancy. Next screening mammogram is recommended in one year. The patient will be  notified of these results.              Last mammogram: was normal.   Breast cancer family updated: see FH.      Past Medical History:   Diagnosis Date    Abnormal mammogram 2019    possible distortion in the retroareolar right breast, dx mammo ordered    DJD (degenerative joint disease), lumbar 2010    H/O diagnostic mammography 13    BIRADS I - right side    H/O mammogram 2017    normal    History of endometrial biopsy 2019    TP: Pathology: benign endometrium     History of mammogram 10/9/13    needs additional views    Hx of mammogram 10/15/2018; 2019    normal; negative birads 1 dense    Pap smear for cervical cancer screening 6/2/10    Negative, HPV negative    Pap smear for cervical cancer screening 01/20/15; 18    normal hpv negative; neg/neg     OB History    Para Term  AB Living   2 2 2 0 0 2   SAB TAB Ectopic Molar Multiple Live Births   0 0 0   0 2      # Outcome Date GA Lbr [place]/2nd Weight Sex Delivery Anes PTL Lv   2 Term 99    F VAGINAL DELI   DESEAN   1 Term 97    M VAGINAL DELI   DESEAN      Obstetric Comments   No pregnancy complications       Past Surgical History:   Procedure Laterality Date    HX WISDOM TEETH EXTRACTION       Family History   Problem Relation Age of Onset    Heart Disease Father         multiple stents, age 66's     Social History     Socioeconomic History    Marital status:      Spouse name: Not on file    Number of children: 2    Years of education: Not on file    Highest education level: Not on file   Occupational History     Employer: [employer]    Financial resource strain: Not on file    Food insecurity     Worry: Not on file     Inability: Not on file    Transportation needs     Medical: Not on file     Non-medical: Not on file   Tobacco Use    Smoking status: Never Smoker    Smokeless tobacco: Never Used   Substance and Sexual Activity    Alcohol use: Yes     Comment: occasionally     Drug use: No    Sexual activity: Yes     Partners: Male     Birth control/protection: None     Comment:  \"Farran\"   Lifestyle    Physical activity     Days per week: Not on file     Minutes per session: Not on file    Stress: Not on file   Relationships    Social connections     Talks on phone: Not on file     Gets together: Not on file     Attends Alevism service: Not on file     Active member of club or organization: Not on file     Attends meetings of clubs or organizations: Not on file     Relationship status: Not on file    Intimate partner violence     Fear of current or ex partner: Not on file     Emotionally abused: Not on file     Physically abused: Not on file     Forced sexual activity: Not on file   Other Topics Concern     Service Not Asked    Blood Transfusions Not Asked    Caffeine Concern Not Asked    Occupational Exposure Not Asked   Perry Aristides Hazards Not Asked    Sleep Concern Not Asked    Stress Concern Not Asked    Weight Concern Not Asked    Special Diet Yes     Comment: 1200 calorie    Back Care Not Asked    Exercise Yes     Comment: at least 4 days/week    Bike Helmet Not Asked    Seat Belt Not Asked    Self-Exams Not Asked   Social History Narrative    Not on file       Allergies   Allergen Reactions    Amoxicillin Hives and Rash     No shortness of breath       Current Outpatient Medications   Medication Sig    ZINC PO Take 1 Tab by mouth daily.  PARoxetine mesylate,menop.sym, (Brisdelle) 7.5 mg cap Take 1 Cap by mouth daily.  levothyroxine (SYNTHROID) 75 mcg tablet Take  by mouth Daily (before breakfast).  cyanocobalamin 1,000 mcg tablet Take 1,000 mcg by mouth daily.  multivitamin capsule Take 1 Cap by mouth daily. No current facility-administered medications for this visit. Patient Active Problem List   Diagnosis Code    DJD (degenerative joint disease), lumbar M47.816    Abnormal mammogram R92.8    Obesity (BMI 30.0-34. 9) E66.9       Review of Systems - History obtained from the patient  Constitutional/general, HEENT, CV, Resp, GI, MSK, Neuro, Psych, Heme/lymph, Skin, Breast ROS: no significant complaints except as noted on HPI     Physical Exam  Visit Vitals  /77 (BP 1 Location: Right upper arm, BP Patient Position: Sitting, BP Cuff Size: Adult)   Pulse 85   Ht 5' 2\" (1.575 m)   Wt 158 lb (71.7 kg)   LMP 12/19/2019 (Approximate)   BMI 28.90 kg/m²       Constitutional  · Appearance: well-nourished, well developed, alert, in no acute distress    HENT  · Head and Face: appears normal    Neck  · Inspection/Palpation: normal appearance, no masses or tenderness  · Lymph Nodes: no lymphadenopathy present  · Thyroid: gland size normal, nontender, no nodules or masses present on palpation    Chest  · Respiratory Effort: breathing unlabored  · Auscultation: normal breath sounds    Cardiovascular  · Heart:  · Auscultation: regular rate and rhythm without murmur    Breasts  · Inspection of Breasts: breasts symmetrical, no skin changes, no discharge present, nipple appearance normal, no skin retraction present  · Palpation of Breasts and Axillae: no masses present on palpation, no breast tenderness  · Axillary Lymph Nodes: no lymphadenopathy present    Gastrointestinal  · Abdominal Examination: abdomen non-tender to palpation, normal bowel sounds, no masses present  · Liver and spleen: no hepatomegaly present, spleen not palpable  · Hernias: no hernias identified    Genitourinary  · External Genitalia: normal appearance for age, no discharge present, no tenderness present, no inflammatory lesions present, no masses present, without atrophy present  · Vagina: normal vaginal vault without central or paravaginal defects, no discharge present, no inflammatory lesions present, no masses present  · Bladder: non-tender to palpation  · Urethra: appears normal  · Cervix: normal   · Uterus: normal size, shape and consistency  · Adnexa: no adnexal tenderness present, no adnexal masses present  · Perineum: perineum within normal limits, no evidence of trauma, no rashes or skin lesions present  · Anus: anus within normal limits, no hemorrhoids present  · Inguinal Lymph Nodes: no lymphadenopathy present    Skin  · General Inspection: no rash, no lesions identified    Neurologic/Psychiatric  · Mental Status:  · Orientation: grossly oriented to person, place and time  · Mood and Affect: mood normal, affect appropriate    Assessment:  50 y.o.  for well woman exam  Encounter Diagnoses   Name Primary?  Encounter for Papanicolaou smear for cervical cancer screening Yes    Encounter for well woman exam with routine gynecological exam     Encounter for screening for human papillomavirus (HPV)     Heat intolerance        Plan:  The patient was counseled about healthy lifestyle, disease prevention, and bone protection. We discussed current self breast exam and mammogram recommendations  We discussed current pap smear and HR HPV testing guidelines  I recommended follow up in one year for routine annual gynecologic exam or sooner if needed  I recommended follow up with a primary care physician for chronic medical problems and evaluation of non-gynecologic concerns and to please contact our office with any GYN questions or concerns. Discussed risks, benefits and alternatives of HRT: including but not limited to dvt/pe/mi/cva/ca/gi risks. We discussed non-hormonal alternatives to HRT and management of symptoms. Pt defers because of risks  Disc rba of SSRI/brisdelle.    rx sent     Folllow up:  [x] return for annual well woman exam in one year or sooner if she is having problems  [] follow up and ultrasound  [x] mammogram  [] 6 months  [] 3 months  [] 1 month    Orders Placed This Encounter    PARoxetine mesylate,menop.sym, (Brisdelle) 7.5 mg cap    PAP IG, APTIMA HPV AND RFX 16/18,45 ([id number])       Results for orders placed or performed during the hospital encounter of 21   [facility] MAMMO BI SCREENING INCL CAD    Narrative    STUDY:  Bilateral Digital Screening 3D breast tomosynthesis is performed and  interpreted in conjunction with CAD. INDICATION:  Screening. Direct comparison is made to multiple prior mammograms. BREAST COMPOSITION: There are scattered fibroglandular densities (approximately  25-50% glandular). FINDINGS: The breast parenchyma is stable bilaterally. No suspicious masses or  calcifications are identified. There is no skin thickening or nipple retraction. There has been no significant change. Impression    BI-RADS 1. Negative. No mammographic evidence of malignancy. Next screening mammogram is recommended in one year. The patient will be  notified of these results.

## 2021-03-30 NOTE — PATIENT INSTRUCTIONS
Well Visit, Women 48 to 72: Care Instructions Your Care Instructions Physical exams can help you stay healthy. Your doctor has checked your overall health and may have suggested ways to take good care of yourself. He or she also may have recommended tests. At home, you can help prevent illness with healthy eating, regular exercise, and other steps. Follow-up care is a key part of your treatment and safety. Be sure to make and go to all appointments, and call your doctor if you are having problems. It's also a good idea to know your test results and keep a list of the medicines you take. How can you care for yourself at home? · Reach and stay at a healthy weight. This will lower your risk for many problems, such as obesity, diabetes, heart disease, and high blood pressure. · Get at least 30 minutes of exercise on most days of the week. Walking is a good choice. You also may want to do other activities, such as running, swimming, cycling, or playing tennis or team sports. · Do not smoke. Smoking can make health problems worse. If you need help quitting, talk to your doctor about stop-smoking programs and medicines. These can increase your chances of quitting for good. · Protect your skin from too much sun. When you're outdoors from 10 a.m. to 4 p.m., stay in the shade or cover up with clothing and a hat with a wide brim. Wear sunglasses that block UV rays. Even when it's cloudy, put broad-spectrum sunscreen (SPF 30 or higher) on any exposed skin. · See a dentist one or two times a year for checkups and to have your teeth cleaned. · Wear a seat belt in the car. Follow your doctor's advice about when to have certain tests. These tests can spot problems early. · Cholesterol. Your doctor will tell you how often to have this done based on your age, family history, or other things that can increase your risk for heart attack and stroke. · Blood pressure.  Have your blood pressure checked during a routine doctor visit. Your doctor will tell you how often to check your blood pressure based on your age, your blood pressure results, and other factors. · Mammogram. Ask your doctor how often you should have a mammogram, which is an X-ray of your breasts. A mammogram can spot breast cancer before it can be felt and when it is easiest to treat. · Pap test and pelvic exam. Ask your doctor how often you should have a Pap test. You may not need to have a Pap test as often as you used to. · Vision. Have your eyes checked every year or two or as often as your doctor suggests. Some experts recommend that you have yearly exams for glaucoma and other age-related eye problems starting at age 48. · Hearing. Tell your doctor if you notice any change in your hearing. You can have tests to find out how well you hear. · Diabetes. Ask your doctor whether you should have tests for diabetes. · Colorectal cancer. Your risk for colorectal cancer gets higher as you get older. Some experts say that adults should start regular screening at age 48 and stop at age 76. Others say to start before age 48 or continue after age 76. Talk with your doctor about your risk and when to start and stop screening. · Thyroid disease. Talk to your doctor about whether to have your thyroid checked as part of a regular physical exam. Women have an increased chance of a thyroid problem. · Osteoporosis. You should begin tests for bone density at age 72. If you are younger than 72, ask your doctor whether you have factors that may increase your risk for this disease. You may want to have this test before age 72. · Heart attack and stroke risk. At least every 4 to 6 years, you should have your risk for heart attack and stroke assessed. Your doctor uses factors such as your age, blood pressure, cholesterol, and whether you smoke or have diabetes to show what your risk for a heart attack or stroke is over the next 10 years. When should you call for help? Watch closely for changes in your health, and be sure to contact your doctor if you have any problems or symptoms that concern you. Where can you learn more? Go to http://www.gray.com/ Enter T273 in the search box to learn more about \"Well Visit, Women 50 to 72: Care Instructions. \" Current as of: May 27, 2020               Content Version: 12.6 © 4007-2833 [employer]. Care instructions adapted under license by [employer] (which disclaims liability or warranty for this information). If you have questions about a medical condition or this instruction, always ask your healthcare professional. [place] [address] any warranty or liability for your use of this information.

## 2021-04-02 LAB
CYTOLOGIST CVX/VAG CYTO: NORMAL
CYTOLOGY CVX/VAG DOC CYTO: NORMAL
CYTOLOGY CVX/VAG DOC THIN PREP: NORMAL
CYTOLOGY HISTORY:: NORMAL
DX ICD CODE: NORMAL
HPV I/H RISK 4 DNA CVX QL PROBE+SIG AMP: NEGATIVE
Lab: NORMAL
OTHER STN SPEC: NORMAL
STAT OF ADQ CVX/VAG CYTO-IMP: NORMAL

## 2021-04-06 ENCOUNTER — TELEPHONE (OUTPATIENT)
Dept: OBGYN CLINIC | Age: 56
End: 2021-04-06

## 2021-04-06 NOTE — PROGRESS NOTES
Failed attempt to reach pt via phone; see encounter.  She just needs to know her pap smear was normal.

## 2021-04-06 NOTE — TELEPHONE ENCOUNTER
----- Message from Robin Laureano MD sent at 4/6/2021  8:12 AM EDT -----  Normal pap smear, message sent if [address] active. Update PMH/: include: Date of pap, Cytology: wnl. For HR HPV results: list NEG or POS, when done.

## 2021-04-06 NOTE — TELEPHONE ENCOUNTER
Tried to LVM requesting Ms. Joseph Ahtanum to contact the office back at her earliest convenience to advise of Mychal Hunter MD's result note and/or recommendations; however, her mailbox was full.

## 2021-04-06 NOTE — PROGRESS NOTES
Normal pap smear, message sent if [address] active. Update PMH/HM: include: Date of pap, Cytology: wnl. For HR HPV results: list NEG or POS, when done.

## 2022-07-18 NOTE — PROGRESS NOTES
[facility]    [facility] updated Per wife, Dr. Orellana, a EMT specialist, will be doing exploratory surgery on the pt. Per wife, their office will be sending a fax since they want to do the surgery ASAP.

## 2022-10-10 ENCOUNTER — OFFICE VISIT (OUTPATIENT)
Dept: OBGYN CLINIC | Age: 57
End: 2022-10-10

## 2022-10-10 VITALS
BODY MASS INDEX: 31.1 KG/M2 | WEIGHT: 169 LBS | DIASTOLIC BLOOD PRESSURE: 82 MMHG | HEART RATE: 89 BPM | HEIGHT: 62 IN | SYSTOLIC BLOOD PRESSURE: 117 MMHG

## 2022-10-10 DIAGNOSIS — Z01.419 ENCOUNTER FOR GYNECOLOGICAL EXAMINATION (GENERAL) (ROUTINE) WITHOUT ABNORMAL FINDINGS: Primary | ICD-10-CM

## 2022-10-10 DIAGNOSIS — R68.82 DECREASED LIBIDO: ICD-10-CM

## 2022-10-10 PROCEDURE — 99396 PREV VISIT EST AGE 40-64: CPT | Performed by: OBSTETRICS & GYNECOLOGY

## 2022-10-10 RX ORDER — ZINC GLUCONATE 10 MG
LOZENGE ORAL
COMMUNITY

## 2022-10-10 NOTE — PROGRESS NOTES
[facility]  [email or web address]/  [phone]           Annual Gynecologic Exam  WWE 40-60  Chief Complaint   Patient presents with    Well Woman    Emmalene Denver is a 49 y.o.  [address] [facility]  female who presents for an annual exam.  Patient's last menstrual period was 2019 (approximate). Patient has the following concerns today: wants hormone levels checked, pt reports mood changes, low libido, feeling uncomfortable or hot while sleeping, advised to schedule lab visit d/t  out early today, wants blood type checked as as well. Pt not on Brisdelle & never filled rx. Having trouble staying asleep. Hot flashes improved. Some dryness with initial intercourse. Menstrual status:  She does not report dysmenorrhea/painful menses. She does not report heavy menses. She does not report irregular bleeding. Sexual history and Contraception:  Social History     Substance and Sexual Activity   Sexual Activity Yes    Partners: Male    Birth control/protection: None    Comment:  \"Farran\"       She does not reports new sexual partner(s) in the last year. Preventive Medicine History:  Her most recent Pap smear result: normal was obtained in 2021    Her most recent HR HPV screen was Negative obtained in     She does not have a history of CARLOS 2, 3 or cervical cancer. Breast health:  [facility] Results (most recent):  Results from [place] encounter on 21    [facility] 3D JELLY W MAMMO BI SCREENING INCL CAD    Narrative  STUDY:  Bilateral Digital Screening 3D breast tomosynthesis is performed and  interpreted in conjunction with CAD. INDICATION:  Screening. Direct comparison is made to multiple prior mammograms. BREAST COMPOSITION: There are scattered fibroglandular densities (approximately  25-50% glandular). FINDINGS: The breast parenchyma is stable bilaterally.  No suspicious masses or  calcifications are identified. There is no skin thickening or nipple retraction. There has been no significant change. Impression  BI-RADS 1. Negative. No mammographic evidence of malignancy. Next screening mammogram is recommended in one year. The patient will be  notified of these results. Last mammogram: approximate date 3/2021 and was normal.   Breast cancer family updated: see FH. Past Medical History:   Diagnosis Date    Abnormal mammogram 2019    possible distortion in the retroareolar right breast, dx mammo ordered    DJD (degenerative joint disease), lumbar 2010    H/O diagnostic mammography 13    BIRADS I - right side    H/O mammogram 2017    normal    History of endometrial biopsy 2019    TP: Pathology: benign endometrium     History of mammogram 10/9/13    needs additional views    Hx of mammogram 10/15/2018; 2019; 3/30/21    normal; negative birads 1 dense; BI-RADS 1. Negative. No mammographic evidence of malignancy.     Pap smear for cervical cancer screening 6/2/10    Negative, HPV negative    Pap smear for cervical cancer screening 01/20/15; 18; 3/30/21    normal hpv negative; neg/neg; neg/hpv neg     OB History    Para Term  AB Living   2 2 2 0 0 2   SAB IAB Ectopic Molar Multiple Live Births   0 0 0   0 2      # Outcome Date GA Lbr [place]/2nd Weight Sex Delivery Anes PTL Lv   2 Term 99    F VAGINAL DELI   DESEAN   1 Term 97    M VAGINAL DELI   DESEAN      Obstetric Comments   No pregnancy complications       Past Surgical History:   Procedure Laterality Date    HX WISDOM TEETH EXTRACTION       Family History   Problem Relation Age of Onset    Heart Disease Father         multiple stents, age 66's     Social History     Socioeconomic History    Marital status:      Spouse name: Not on file    Number of children: 2    Years of education: Not on file    Highest education level: Not on file   Occupational History     Employer: [employer]   Tobacco Use    Smoking status: Never    Smokeless tobacco: Never   Substance and Sexual Activity    Alcohol use: Yes     Comment: socially    Drug use: No    Sexual activity: Yes     Partners: Male     Birth control/protection: None     Comment:  \"Farran\"   Other Topics Concern     Service Not Asked    Blood Transfusions Not Asked    Caffeine Concern Not Asked    Occupational Exposure Not Asked    Hobby Hazards Not Asked    Sleep Concern Not Asked    Stress Concern Not Asked    Weight Concern Not Asked    Special Diet Yes     Comment: 1200 calorie    Back Care Not Asked    Exercise Yes     Comment: at least 4 days/week    Bike Helmet Not Asked    Seat Belt Not Asked    Self-Exams Not Asked   Social History Narrative    Not on file     Social Determinants of Health     Financial Resource Strain: Not on file   Food Insecurity: Not on file   Transportation Needs: Not on file   Physical Activity: Not on file   Stress: Not on file   Social Connections: Not on file   Intimate Partner Violence: Not on file   Housing Stability: Not on file       Allergies   Allergen Reactions    Amoxicillin Hives and Rash     No shortness of breath       Current Outpatient Medications   Medication Sig    magnesium 250 mg tab Take  by mouth. topiramate (TOPAMAX) 25 mg tablet     phentermine (ADIPEX_P) 15 mg capsule TAKE 1 CAPSULE BY MOUTH EVERY MORNING    levothyroxine (SYNTHROID) 75 mcg tablet Take  by mouth Daily (before breakfast). cyanocobalamin 1,000 mcg tablet Take 1,000 mcg by mouth daily. multivitamin capsule Take 1 Cap by mouth daily. phentermine-topiramate (Qsymia) 7.5-46 mg CM24 1 cap(s) (Patient not taking: Reported on 10/10/2022)    ZINC PO Take 1 Tab by mouth daily. (Patient not taking: Reported on 10/10/2022)    PARoxetine mesylate,menop.sym, (Brisdelle) 7.5 mg cap Take 1 Cap by mouth daily.  (Patient not taking: Reported on 10/10/2022)     No current facility-administered medications for this visit. Patient Active Problem List   Diagnosis Code    DJD (degenerative joint disease), lumbar M47.816    Abnormal mammogram R92.8    Obesity (BMI 30.0-34. 9) E66.9         Review of Systems - History obtained from the patient and patient filled out questionnaire   Constitutional/general, HEENT, CV, Resp, GI, MSK, Neuro, Psych, Heme/lymph, Skin, Breast ROS: no significant complaints except as noted on HPI    Physical Exam  Visit Vitals  /82   Pulse 89   Ht 5' 2\" (1.575 m)   Wt 169 lb (76.7 kg)   LMP 12/19/2019 (Approximate)   BMI 30.91 kg/m²       Constitutional  Appearance: well-nourished, well developed, alert, in no acute distress    HENT  Head and Face: appears normal    Neck  Inspection/Palpation: normal appearance, no masses or tenderness  Lymph Nodes: no lymphadenopathy present  Thyroid: gland size normal, nontender, no nodules or masses present on palpation    Chest  Respiratory Effort: breathing unlabored  Auscultation: normal breath sounds    Cardiovascular  Heart:   Auscultation: regular rate and rhythm without murmur    Breasts  Inspection of Breasts: breasts symmetrical, no skin changes, no discharge present, nipple appearance normal, no skin retraction present  Palpation of Breasts and Axillae: no masses present on palpation, no breast tenderness  Axillary Lymph Nodes: no lymphadenopathy present    Gastrointestinal  Abdominal Examination: abdomen non-tender to palpation, normal bowel sounds, no masses present  Liver and spleen: no hepatomegaly present, spleen not palpable  Hernias: no hernias identified    Genitourinary  External Genitalia: normal appearance for age, no discharge present, no tenderness present, no inflammatory lesions present, no masses present  Vagina: normal vaginal vault without central or paravaginal defects, minimal discharge present, no inflammatory lesions present, no masses present  Bladder: non-tender to palpation  Urethra: appears normal  Cervix: normal   Uterus: normal size, shape and consistency  Adnexa: no adnexal tenderness present, no adnexal masses present  Perineum: perineum within normal limits, no evidence of trauma, no rashes or skin lesions present  Anus: anus within normal limits, no hemorrhoids present  Inguinal Lymph Nodes: no lymphadenopathy present    Skin  General Inspection: no rash, no lesions identified    Neurologic/Psychiatric  Mental Status:  Orientation: grossly oriented to person, place and time  Mood and Affect: mood normal, affect appropriate    Assessment:  49 y.o.  for well woman exam  Encounter Diagnoses   Name Primary? Encounter for gynecological examination (general) (routine) without abnormal findings Yes    Decreased libido        Plan:  The patient was counseled about diet, exercise, healthy lifestyle  We discussed current pap smear and HR HPV testing guidelines. I recommended follow up one year for routine annual gynecologic exam or sooner prn  Handouts were given to the patient  I recommended follow up with a primary care physician for chronic medical problems and evaluation of non-gynecologic concerns and to please contact our office with any GYN questions or concerns. I recommended testing per CDC guidelines and at patient request.   Disc typical hormonal and age related changes  Disc options for libido  Disc rba of HRT/brisdelle/estrogen-topical vs oral/addyi/kyleesi  Pt will dw partner but plan observation     Folllow up:  [x] return for annual well woman exam in one year or sooner if she is having problems  [] follow up and ultrasound  [] 6 months  [] 3 months  [] 6 weeks   [] 1 month    No orders of the defined types were placed in this encounter. No results found for any visits on 10/10/22.

## 2023-10-23 ENCOUNTER — TRANSCRIBE ORDERS (OUTPATIENT)
Facility: HOSPITAL | Age: 58
End: 2023-10-23

## 2023-10-23 DIAGNOSIS — Z12.31 VISIT FOR SCREENING MAMMOGRAM: Primary | ICD-10-CM

## 2024-01-18 ENCOUNTER — HOSPITAL ENCOUNTER (OUTPATIENT)
Facility: HOSPITAL | Age: 59
Discharge: HOME | End: 2024-01-18
Attending: OBSTETRICS & GYNECOLOGY
Payer: COMMERCIAL

## 2024-01-18 ENCOUNTER — OFFICE VISIT (OUTPATIENT)
Age: 59
End: 2024-01-18
Payer: COMMERCIAL

## 2024-01-18 VITALS
SYSTOLIC BLOOD PRESSURE: 138 MMHG | DIASTOLIC BLOOD PRESSURE: 94 MMHG | WEIGHT: 158 LBS | BODY MASS INDEX: 29.08 KG/M2 | HEIGHT: 62 IN

## 2024-01-18 VITALS — HEIGHT: 62 IN | BODY MASS INDEX: 28.16 KG/M2 | WEIGHT: 153 LBS

## 2024-01-18 DIAGNOSIS — Z12.4 CERVICAL CANCER SCREENING: ICD-10-CM

## 2024-01-18 DIAGNOSIS — Z12.31 VISIT FOR SCREENING MAMMOGRAM: ICD-10-CM

## 2024-01-18 DIAGNOSIS — Z11.51 ENCOUNTER FOR SCREENING FOR HUMAN PAPILLOMAVIRUS (HPV): ICD-10-CM

## 2024-01-18 DIAGNOSIS — Z01.419 ENCOUNTER FOR GYNECOLOGICAL EXAMINATION: Primary | ICD-10-CM

## 2024-01-18 PROCEDURE — 99396 PREV VISIT EST AGE 40-64: CPT | Performed by: OBSTETRICS & GYNECOLOGY

## 2024-01-18 PROCEDURE — 77063 BREAST TOMOSYNTHESIS BI: CPT

## 2024-01-18 RX ORDER — SEMAGLUTIDE 0.68 MG/ML
INJECTION, SOLUTION SUBCUTANEOUS
COMMUNITY
Start: 2023-11-08

## 2024-01-18 SDOH — ECONOMIC STABILITY: FOOD INSECURITY: WITHIN THE PAST 12 MONTHS, THE FOOD YOU BOUGHT JUST DIDN'T LAST AND YOU DIDN'T HAVE MONEY TO GET MORE.: NEVER TRUE

## 2024-01-18 SDOH — ECONOMIC STABILITY: INCOME INSECURITY: HOW HARD IS IT FOR YOU TO PAY FOR THE VERY BASICS LIKE FOOD, HOUSING, MEDICAL CARE, AND HEATING?: NOT HARD AT ALL

## 2024-01-18 SDOH — ECONOMIC STABILITY: FOOD INSECURITY: WITHIN THE PAST 12 MONTHS, YOU WORRIED THAT YOUR FOOD WOULD RUN OUT BEFORE YOU GOT MONEY TO BUY MORE.: NEVER TRUE

## 2024-01-18 SDOH — ECONOMIC STABILITY: HOUSING INSECURITY
IN THE LAST 12 MONTHS, WAS THERE A TIME WHEN YOU DID NOT HAVE A STEADY PLACE TO SLEEP OR SLEPT IN A SHELTER (INCLUDING NOW)?: NO

## 2024-01-18 ASSESSMENT — PATIENT HEALTH QUESTIONNAIRE - PHQ9
SUM OF ALL RESPONSES TO PHQ QUESTIONS 1-9: 0
SUM OF ALL RESPONSES TO PHQ QUESTIONS 1-9: 0
SUM OF ALL RESPONSES TO PHQ9 QUESTIONS 1 & 2: 0
2. FEELING DOWN, DEPRESSED OR HOPELESS: 0
SUM OF ALL RESPONSES TO PHQ QUESTIONS 1-9: 0
1. LITTLE INTEREST OR PLEASURE IN DOING THINGS: 0
SUM OF ALL RESPONSES TO PHQ QUESTIONS 1-9: 0

## 2024-01-18 NOTE — PROGRESS NOTES
Lizbeth Yeung is a 58 y.o. female returns for an annual exam     Chief Complaint   Patient presents with    Annual Exam       No LMP recorded. (Menstrual status: Menopause).  Her periods are absent  Problems: no problems  Birth Control: post menopausal status.  Last Pap: see report obtained 3 year(s) ago.  She does not have a history of STACEY 2, 3 or cervical cancer.   Last Mammogram:  in radiology today .  It was see report.   Last colonoscopy: see report obtained 4 year(s) ago.      1. Have you been to the ER, urgent care clinic, or hospitalized since your last visit? No    2. Have you seen or consulted any other health care providers outside of the [facility] since your last visit? No    Examination chaperoned by Yuko Lackey LPN.  
Breasts: breasts symmetrical, no skin changes, no discharge present, nipple appearance normal, no skin retraction present  Palpation of Breasts and Axillae: no masses present on palpation, no breast tenderness  Axillary Lymph Nodes: no lymphadenopathy present    Gastrointestinal  Abdominal Examination: abdomen non-tender to palpation, normal bowel sounds, no masses present  Liver and spleen: no hepatomegaly present, spleen not palpable  Hernias: no hernias identified    Genitourinary  External Genitalia: normal appearance for age, no discharge present, no tenderness present, no inflammatory lesions present, no masses present  Vagina: normal vaginal vault without central or paravaginal defects, no discharge present, no inflammatory lesions present, no masses present  Bladder: non-tender to palpation  Urethra: appears normal  Cervix: normal   Uterus: normal size, shape and consistency  Adnexa: no adnexal tenderness present, no adnexal masses present  Perineum: perineum within normal limits, no evidence of trauma, no rashes or skin lesions present  Anus: anus within normal limits, no hemorrhoids present  Inguinal Lymph Nodes: no lymphadenopathy present    Skin  General Inspection: no rash, no lesions identified    Neurologic/Psychiatric  Mental Status:  Orientation: grossly oriented to person, place and time  Mood and Affect: mood normal, affect appropriate    Assessment:  58 y.o.  for well woman exam  Her current medical status is satisfactory with no evidence of significant gynecologic issues.  Encounter Diagnoses   Name Primary?    Encounter for gynecological examination Yes    Cervical cancer screening     Encounter for screening for human papillomavirus (HPV)        Plan:  I recommended follow up one year for routine annual gynecologic exam or sooner prn  Patient should follow up with a primary care physician for chronic medical problems and evaluation of non-gynecologic concerns and to please contact our

## 2024-01-25 LAB
CYTOLOGIST CVX/VAG CYTO: NORMAL
CYTOLOGY CVX/VAG DOC CYTO: NORMAL
CYTOLOGY CVX/VAG DOC THIN PREP: NORMAL
DX ICD CODE: NORMAL
HPV GENOTYPE REFLEX: NORMAL
HPV I/H RISK 4 DNA CVX QL PROBE+SIG AMP: NEGATIVE
Lab: NORMAL
Lab: NORMAL
OTHER STN SPEC: NORMAL
STAT OF ADQ CVX/VAG CYTO-IMP: NORMAL

## 2025-03-21 ENCOUNTER — TRANSCRIBE ORDERS (OUTPATIENT)
Facility: HOSPITAL | Age: 60
End: 2025-03-21

## 2025-03-21 DIAGNOSIS — Z12.31 ENCOUNTER FOR SCREENING MAMMOGRAM FOR MALIGNANT NEOPLASM OF BREAST: Primary | ICD-10-CM

## 2025-05-01 NOTE — PROGRESS NOTES
Lizbeth Yeung is a 59 y.o. female returns for an annual exam     Chief Complaint   Patient presents with    Annual Exam       No LMP recorded. Patient is postmenopausal.    Problems: problems - make sure hormone is good level, and no sex drive  Birth Control: none.  Last Pap: normal obtained 1 year(s) ago.  She does not know have a history of STACEY 2, 3 or cervical cancer.   Last Mammogram: had her mammogram today in our office.  It was see report.   Last colonoscopy: normal obtained 5 year(s) ago.      1. Have you been to the ER, urgent care clinic, or hospitalized since your last visit? No    2. Have you seen or consulted any other health care providers outside of the [facility] since your last visit? No    Examination chaperoned by Zeina Guevara MA.

## 2025-05-02 ENCOUNTER — HOSPITAL ENCOUNTER (OUTPATIENT)
Facility: HOSPITAL | Age: 60
Discharge: HOME | End: 2025-05-02
Attending: STUDENT IN AN ORGANIZED HEALTH CARE EDUCATION/TRAINING PROGRAM
Payer: COMMERCIAL

## 2025-05-02 ENCOUNTER — OFFICE VISIT (OUTPATIENT)
Age: 60
End: 2025-05-02
Payer: COMMERCIAL

## 2025-05-02 VITALS
SYSTOLIC BLOOD PRESSURE: 119 MMHG | WEIGHT: 157 LBS | HEIGHT: 62 IN | DIASTOLIC BLOOD PRESSURE: 78 MMHG | BODY MASS INDEX: 28.89 KG/M2 | HEART RATE: 87 BPM

## 2025-05-02 DIAGNOSIS — Z12.31 ENCOUNTER FOR SCREENING MAMMOGRAM FOR MALIGNANT NEOPLASM OF BREAST: ICD-10-CM

## 2025-05-02 DIAGNOSIS — Z01.419 ENCOUNTER FOR GYNECOLOGICAL EXAMINATION: Primary | ICD-10-CM

## 2025-05-02 PROCEDURE — 99396 PREV VISIT EST AGE 40-64: CPT | Performed by: STUDENT IN AN ORGANIZED HEALTH CARE EDUCATION/TRAINING PROGRAM

## 2025-05-02 PROCEDURE — 77067 SCR MAMMO BI INCL CAD: CPT

## 2025-05-02 PROCEDURE — 77063 BREAST TOMOSYNTHESIS BI: CPT

## 2025-05-02 SDOH — ECONOMIC STABILITY: FOOD INSECURITY: WITHIN THE PAST 12 MONTHS, THE FOOD YOU BOUGHT JUST DIDN'T LAST AND YOU DIDN'T HAVE MONEY TO GET MORE.: NEVER TRUE

## 2025-05-02 SDOH — ECONOMIC STABILITY: FOOD INSECURITY: WITHIN THE PAST 12 MONTHS, YOU WORRIED THAT YOUR FOOD WOULD RUN OUT BEFORE YOU GOT MONEY TO BUY MORE.: NEVER TRUE

## 2025-05-02 ASSESSMENT — PATIENT HEALTH QUESTIONNAIRE - PHQ9
1. LITTLE INTEREST OR PLEASURE IN DOING THINGS: NOT AT ALL
2. FEELING DOWN, DEPRESSED OR HOPELESS: NOT AT ALL
SUM OF ALL RESPONSES TO PHQ QUESTIONS 1-9: 0

## 2025-05-02 NOTE — PROGRESS NOTES
Annual exam ages 40-64      Lizbeth Yeung is a ,  59 y.o. female   No LMP recorded. Patient is postmenopausal.    She presents for her annual checkup.     She is having problems - low libido x 1 yr. NO pain or dryness with intercourse. No new medications. Has been on stable dose synthroid with endo for several years. Partner of 35 yrs hasn't changed. No health concerns.       Menstrual status:    Post menopausal    She reports no premenstrual symptoms.    Hormonal status:  She reports no perimenstrual type symptoms.   She is not having vasomotor symptoms.  The patient is not using any ERT.    Sexual history:    She  has no history on file for sexual activity.    Medical conditions:    Since her last annual GYN exam about 1 year ago, she has not the following changes in her health history: none.     Surgical history confirmed with patient.  has a past surgical history that includes [place] tooth extraction.    Pap and Mammogram History:    Her most recent Pap smear was normal, obtained 1 year(s) ago.    The patient had her mammogram today in our office.    Breast Cancer History/Substance Abuse: negative      Osteoporosis History:    Family history does not include a first or second degree relative with osteopenia or osteoporosis.      Past Medical History:   Diagnosis Date    Abnormal mammogram 2019    possible distortion in the retroareolar right breast, dx mammo ordered    DJD (degenerative joint disease), lumbar 2010    H/O diagnostic mammography 2013    BIRADS I - right side    H/O mammogram 2017    normal    H/O mammogram 10/10/2022    normal    H/O mammogram 2024    low risk    History of endometrial biopsy 2019    TP: Pathology: benign endometrium     History of mammogram 10/09/2013    needs additional views    Hx of mammogram 10/15/2018; 2019; 3/30/21    normal; negative birads 1 dense; BI-RADS 1. Negative. No mammographic evidence of malignancy.    Pap smear for